# Patient Record
Sex: MALE | Race: WHITE | Employment: UNEMPLOYED | URBAN - METROPOLITAN AREA
[De-identification: names, ages, dates, MRNs, and addresses within clinical notes are randomized per-mention and may not be internally consistent; named-entity substitution may affect disease eponyms.]

---

## 2018-08-01 ENCOUNTER — HOSPITAL ENCOUNTER (EMERGENCY)
Age: 23
Discharge: HOME | End: 2018-08-01

## 2018-08-01 VITALS
HEIGHT: 64 IN | HEART RATE: 60 BPM | TEMPERATURE: 98 F | BODY MASS INDEX: 29.88 KG/M2 | OXYGEN SATURATION: 100 % | SYSTOLIC BLOOD PRESSURE: 150 MMHG | WEIGHT: 175 LBS | DIASTOLIC BLOOD PRESSURE: 90 MMHG | RESPIRATION RATE: 16 BRPM

## 2018-08-01 DIAGNOSIS — K04.7 DENTAL INFECTION: ICD-10-CM

## 2018-08-01 DIAGNOSIS — K02.9 DENTAL CARIES: Primary | ICD-10-CM

## 2018-08-01 PROCEDURE — 99283 EMERGENCY DEPT VISIT LOW MDM: CPT

## 2018-08-01 RX ORDER — CLINDAMYCIN HYDROCHLORIDE 300 MG/1
300 CAPSULE ORAL 3 TIMES DAILY
Qty: 30 CAPSULE | Refills: 0 | Status: SHIPPED | OUTPATIENT
Start: 2018-08-01 | End: 2018-08-11

## 2018-08-01 NOTE — ED INITIAL ASSESSMENT (HPI)
Pt states he has had r facial swelling for the last week, no known injury mostly to back of his mouth.  Painful to open and close his jaw
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
## (undated) NOTE — ED AVS SNAPSHOT
Allyson Kang   MRN: [id number]    Department:  [facility] in [place]   Date of Visit:  8/1/2018           Disclosure     Insurance plans vary and the physician(s) referred by the ER may not be covered by your plan.  Please contact tell this physician (or your personal doctor if your instructions are to return to your personal doctor) about any new or lasting problems. The primary care or specialist physician will see patients referred from the [facility] Emergency Department.  Adelfo Gaston